# Patient Record
Sex: FEMALE | Race: WHITE | NOT HISPANIC OR LATINO | URBAN - METROPOLITAN AREA
[De-identification: names, ages, dates, MRNs, and addresses within clinical notes are randomized per-mention and may not be internally consistent; named-entity substitution may affect disease eponyms.]

---

## 2017-01-07 ENCOUNTER — COMMUNICATION - HEALTHEAST (OUTPATIENT)
Dept: INTERNAL MEDICINE | Facility: CLINIC | Age: 82
End: 2017-01-07

## 2017-01-07 DIAGNOSIS — M81.0 OSTEOPOROSIS: ICD-10-CM

## 2017-02-06 ENCOUNTER — COMMUNICATION - HEALTHEAST (OUTPATIENT)
Dept: INTERNAL MEDICINE | Facility: CLINIC | Age: 82
End: 2017-02-06

## 2017-02-06 DIAGNOSIS — J44.9 COPD (CHRONIC OBSTRUCTIVE PULMONARY DISEASE) (H): ICD-10-CM

## 2017-03-23 ENCOUNTER — COMMUNICATION - HEALTHEAST (OUTPATIENT)
Dept: INTERNAL MEDICINE | Facility: CLINIC | Age: 82
End: 2017-03-23

## 2017-03-23 ENCOUNTER — RECORDS - HEALTHEAST (OUTPATIENT)
Dept: ADMINISTRATIVE | Facility: OTHER | Age: 82
End: 2017-03-23

## 2017-03-23 DIAGNOSIS — R42 DIZZINESS: ICD-10-CM

## 2017-04-05 ENCOUNTER — COMMUNICATION - HEALTHEAST (OUTPATIENT)
Dept: INTERNAL MEDICINE | Facility: CLINIC | Age: 82
End: 2017-04-05

## 2017-04-07 ENCOUNTER — COMMUNICATION - HEALTHEAST (OUTPATIENT)
Dept: INTERNAL MEDICINE | Facility: CLINIC | Age: 82
End: 2017-04-07

## 2017-04-07 ENCOUNTER — RECORDS - HEALTHEAST (OUTPATIENT)
Dept: ADMINISTRATIVE | Facility: OTHER | Age: 82
End: 2017-04-07

## 2017-04-10 ENCOUNTER — COMMUNICATION - HEALTHEAST (OUTPATIENT)
Dept: INTERNAL MEDICINE | Facility: CLINIC | Age: 82
End: 2017-04-10

## 2017-04-10 DIAGNOSIS — M54.9 BACK PAIN: ICD-10-CM

## 2017-04-26 ENCOUNTER — COMMUNICATION - HEALTHEAST (OUTPATIENT)
Dept: INTERNAL MEDICINE | Facility: CLINIC | Age: 82
End: 2017-04-26

## 2017-04-26 ENCOUNTER — RECORDS - HEALTHEAST (OUTPATIENT)
Dept: ADMINISTRATIVE | Facility: OTHER | Age: 82
End: 2017-04-26

## 2017-06-20 ENCOUNTER — RECORDS - HEALTHEAST (OUTPATIENT)
Dept: ADMINISTRATIVE | Facility: OTHER | Age: 82
End: 2017-06-20

## 2017-09-21 ENCOUNTER — OFFICE VISIT - HEALTHEAST (OUTPATIENT)
Dept: INTERNAL MEDICINE | Facility: CLINIC | Age: 82
End: 2017-09-21

## 2017-09-21 DIAGNOSIS — Z00.00 HEALTH CARE MAINTENANCE: ICD-10-CM

## 2017-09-21 DIAGNOSIS — Z51.81 MEDICATION MONITORING ENCOUNTER: ICD-10-CM

## 2017-09-21 LAB
CHOLEST SERPL-MCNC: 189 MG/DL
FASTING STATUS PATIENT QL REPORTED: NO
HDLC SERPL-MCNC: 66 MG/DL
LDLC SERPL CALC-MCNC: 108 MG/DL
TRIGL SERPL-MCNC: 75 MG/DL

## 2017-09-22 ENCOUNTER — COMMUNICATION - HEALTHEAST (OUTPATIENT)
Dept: INTERNAL MEDICINE | Facility: CLINIC | Age: 82
End: 2017-09-22

## 2017-11-05 ENCOUNTER — COMMUNICATION - HEALTHEAST (OUTPATIENT)
Dept: INTERNAL MEDICINE | Facility: CLINIC | Age: 82
End: 2017-11-05

## 2017-11-05 DIAGNOSIS — E87.6 DIURETIC-INDUCED HYPOKALEMIA: ICD-10-CM

## 2017-11-05 DIAGNOSIS — R60.9 EDEMA: ICD-10-CM

## 2017-11-05 DIAGNOSIS — T50.2X5A DIURETIC-INDUCED HYPOKALEMIA: ICD-10-CM

## 2017-11-07 RX ORDER — FUROSEMIDE 40 MG
TABLET ORAL
Qty: 90 TABLET | Refills: 3 | Status: SHIPPED | OUTPATIENT
Start: 2017-11-07

## 2017-11-07 RX ORDER — POTASSIUM CHLORIDE 750 MG/1
CAPSULE, EXTENDED RELEASE ORAL
Qty: 90 CAPSULE | Refills: 3 | Status: SHIPPED | OUTPATIENT
Start: 2017-11-07

## 2017-11-24 ENCOUNTER — RECORDS - HEALTHEAST (OUTPATIENT)
Dept: ADMINISTRATIVE | Facility: OTHER | Age: 82
End: 2017-11-24

## 2017-12-12 ENCOUNTER — COMMUNICATION - HEALTHEAST (OUTPATIENT)
Dept: INTERNAL MEDICINE | Facility: CLINIC | Age: 82
End: 2017-12-12

## 2017-12-12 DIAGNOSIS — M54.9 BACK PAIN: ICD-10-CM

## 2017-12-16 ENCOUNTER — COMMUNICATION - HEALTHEAST (OUTPATIENT)
Dept: INTERNAL MEDICINE | Facility: CLINIC | Age: 82
End: 2017-12-16

## 2017-12-16 DIAGNOSIS — M81.0 OSTEOPOROSIS: ICD-10-CM

## 2017-12-17 RX ORDER — ALENDRONATE SODIUM 70 MG/1
TABLET ORAL
Qty: 12 TABLET | Refills: 1 | Status: SHIPPED | OUTPATIENT
Start: 2017-12-17

## 2017-12-28 ENCOUNTER — COMMUNICATION - HEALTHEAST (OUTPATIENT)
Dept: INTERNAL MEDICINE | Facility: CLINIC | Age: 82
End: 2017-12-28

## 2017-12-28 DIAGNOSIS — M54.9 BACK PAIN: ICD-10-CM

## 2017-12-29 RX ORDER — HYDROCODONE BITARTRATE AND ACETAMINOPHEN 5; 325 MG/1; MG/1
TABLET ORAL
Qty: 40 TABLET | Refills: 0 | Status: SHIPPED | OUTPATIENT
Start: 2017-12-29

## 2018-01-18 ENCOUNTER — RECORDS - HEALTHEAST (OUTPATIENT)
Dept: LAB | Facility: CLINIC | Age: 83
End: 2018-01-18

## 2018-01-18 LAB
ALBUMIN UR-MCNC: ABNORMAL MG/DL
ANION GAP SERPL CALCULATED.3IONS-SCNC: 7 MMOL/L (ref 5–18)
APPEARANCE UR: ABNORMAL
BACTERIA #/AREA URNS HPF: ABNORMAL HPF
BILIRUB UR QL STRIP: NEGATIVE
BUN SERPL-MCNC: 15 MG/DL (ref 8–28)
CALCIUM SERPL-MCNC: 9.4 MG/DL (ref 8.5–10.5)
CHLORIDE BLD-SCNC: 100 MMOL/L (ref 98–107)
CO2 SERPL-SCNC: 32 MMOL/L (ref 22–31)
COLOR UR AUTO: YELLOW
CREAT SERPL-MCNC: 0.69 MG/DL (ref 0.6–1.1)
GFR SERPL CREATININE-BSD FRML MDRD: >60 ML/MIN/1.73M2
GLUCOSE BLD-MCNC: 93 MG/DL (ref 70–125)
GLUCOSE UR STRIP-MCNC: NEGATIVE MG/DL
HGB BLD-MCNC: 12.2 G/DL (ref 12–16)
HGB UR QL STRIP: ABNORMAL
KETONES UR STRIP-MCNC: NEGATIVE MG/DL
LEUKOCYTE ESTERASE UR QL STRIP: ABNORMAL
MUCOUS THREADS #/AREA URNS LPF: ABNORMAL LPF
NITRATE UR QL: NEGATIVE
PH UR STRIP: 6.5 [PH] (ref 4.5–8)
POTASSIUM BLD-SCNC: 3.8 MMOL/L (ref 3.5–5)
RBC #/AREA URNS AUTO: ABNORMAL HPF
SODIUM SERPL-SCNC: 139 MMOL/L (ref 136–145)
SP GR UR STRIP: 1.01 (ref 1–1.03)
SQUAMOUS #/AREA URNS AUTO: ABNORMAL LPF
UROBILINOGEN UR STRIP-ACNC: ABNORMAL
WBC #/AREA URNS AUTO: >100 HPF
WBC CLUMPS #/AREA URNS HPF: PRESENT /[HPF]

## 2018-01-19 LAB — 25(OH)D3 SERPL-MCNC: 51.4 NG/ML (ref 30–80)

## 2018-01-20 LAB — BACTERIA SPEC CULT: ABNORMAL

## 2018-08-03 ENCOUNTER — RECORDS - HEALTHEAST (OUTPATIENT)
Dept: LAB | Facility: CLINIC | Age: 83
End: 2018-08-03

## 2018-08-06 LAB
ANION GAP SERPL CALCULATED.3IONS-SCNC: 9 MMOL/L (ref 5–18)
BUN SERPL-MCNC: 15 MG/DL (ref 8–28)
CALCIUM SERPL-MCNC: 9 MG/DL (ref 8.5–10.5)
CHLORIDE BLD-SCNC: 101 MMOL/L (ref 98–107)
CO2 SERPL-SCNC: 33 MMOL/L (ref 22–31)
CREAT SERPL-MCNC: 0.6 MG/DL (ref 0.6–1.1)
GFR SERPL CREATININE-BSD FRML MDRD: >60 ML/MIN/1.73M2
GLUCOSE BLD-MCNC: 87 MG/DL (ref 70–125)
POTASSIUM BLD-SCNC: 3.6 MMOL/L (ref 3.5–5)
SODIUM SERPL-SCNC: 143 MMOL/L (ref 136–145)

## 2021-05-31 VITALS — BODY MASS INDEX: 17.79 KG/M2 | WEIGHT: 106.9 LBS

## 2021-06-13 NOTE — PROGRESS NOTES
Assessment and Plan:   1.  Wellness assessment: Emerald lives with her daughter and grandson.  She would require assisted living without their assistance.  She denies depressive symptoms.  She has significant cognitive decline scoring 17 of 30 on Mini-Mental.  Her health risk assessment was discussed.  Resuscitation status was reaffirmed as DNR.      1. Health care maintenance  Flu shot was advised  - Influenza High Dose, Seasonal 65+ yrs    2.  COPD: O2 saturation is 95%.  She is using Advair.  She has not used albuterol recently    3.  Osteoporosis: She is using alendronate.  She continues a calcium and vitamin D supplement    4.  Sensorineural hearing loss: She uses hearing aids    Plan:  1.  Labs as outlined.  She will be notified of test result  2.  Mini-Mental was done and reviewed.  She scores 17 of 30  3.  Recheck bone density next year  4.  See in 1 year or as needed  The patient's current medical problems were reviewed.    The following health maintenance schedule was reviewed with the patient and provided in printed form in the after visit summary:   Health Maintenance   Topic Date Due     INFLUENZA VACCINE RULE BASED (1) 08/01/2017     FALL RISK ASSESSMENT  09/21/2018     DXA SCAN  12/09/2018     ADVANCE DIRECTIVES DISCUSSED WITH PATIENT  08/10/2020     TD 18+ HE  08/10/2025     PNEUMOCOCCAL POLYSACCHARIDE VACCINE AGE 65 AND OVER  Completed     PNEUMOCOCCAL CONJUGATE VACCINE FOR ADULTS (PCV13 OR PREVNAR)  Completed     ZOSTER VACCINE  Addressed        Subjective:   Chief Complaint: Emerald Schrader is an 89 y.o. female here for an Annual Wellness visit. She is accompanied by her daughter who provides history due to dementia     HPI:      Review of Systems:    She is feeling well. She mentions that her memory is poor.  Her daughter and  are providing support for her at home. Her grandson and his wife are currently living in the house and helping with care. She is also visited by a nurse every  60 days.  She is using Advair nightly. She has not needed albuterol. Her daughter contributes that they avoid hot places to help with breathing. She has not needed a nebulizer in over a year.   She saw the podiatrist earlier this week for a sore on the bottom of her right foot.   She denies stomach issues. Her daughter reports her appetite is good.   Please see above.  The rest of the review of systems are negative for all systems.    Patient Care Team:  Baltazar Palomino MD as PCP - General     Patient Active Problem List   Diagnosis     Emphysema     HTN (hypertension)     Mitral valve annular calcification     Allergy to cats     Dyspnea and respiratory abnormality     Heart murmur     Dementia     Actinic keratoses     COPD (chronic obstructive pulmonary disease)     Closed fracture of dorsal (thoracic) vertebra without mention of spinal cord injury     Other disorders of bone and cartilage(733.99)     Peripheral neuropathy     Shortness of breath     DNR (do not resuscitate)     Past Medical History:   Diagnosis Date     Hypertension       Past Surgical History:   Procedure Laterality Date     APPENDECTOMY       cataracts Bilateral      SKIN BIOPSY Left     distal ankle     TONSILLECTOMY        Family History   Problem Relation Age of Onset     Emphysema Father       Social History     Social History     Marital status:      Spouse name: N/A     Number of children: 2     Years of education: N/A     Occupational History     Not on file.     Social History Main Topics     Smoking status: Former Smoker     Start date: 1/1/1950     Quit date: 1/1/1975     Smokeless tobacco: Not on file     Alcohol use 0.6 oz/week     1 Cans of beer per week      Comment: occassional      Drug use: No     Sexual activity: Not on file     Other Topics Concern     Not on file     Social History Narrative      Current Outpatient Prescriptions   Medication Sig Dispense Refill     albuterol (PROVENTIL HFA;VENTOLIN HFA) 90  mcg/actuation inhaler Inhale 2 puffs every 6 (six) hours as needed for wheezing. 1 each 0     alendronate (FOSAMAX) 70 MG tablet Take 1 tablet (70 mg total) by mouth every 7 days. Take in the morning on an empty stomach with a full glass of water 30 minutes before food 4 tablet 11     calcium carbonate (OS-MIQUEL) 600 mg (1,500 mg) tablet Take 600 mg by mouth 2 (two) times a day with meals.       cholecalciferol, vitamin D3, 1,000 unit tablet Take 1,000 Units by mouth daily.       fluticasone-salmeterol (ADVAIR DISKUS) 250-50 mcg/dose DISKUS INHALE ONE PUFF BY MOUTH EVERY TWELVE HOURS 60 each 10     gabapentin (NEURONTIN) 100 MG capsule Take 100 mg by mouth 3 (three) times a day. 270 capsule 3     HYDROcodone-acetaminophen 5-325 mg per tablet Take 1-2 tablets by mouth every 4-6 hours as needed for pain. 40 tablet 0     ipratropium-albuterol (DUO-NEB) 0.5-2.5 mg/mL nebulizer 3 mL 4 (four) times a day as needed.       potassium chloride (MICRO-K) 10 mEq CR capsule TAKE ONE CAPSULE BY MOUTH ONE TIME DAILY 90 capsule 3     No current facility-administered medications for this visit.       Objective:   Vital Signs:   Visit Vitals     /68     Pulse 68     Wt 106 lb 14.4 oz (48.5 kg)     SpO2 93%     BMI 17.79 kg/m2        VisionScreening:  No exam data present     PHYSICAL EXAM  Constitutional:   Reveals an alert, pleasant, talkative, elderly woman. Affect appropriate. Does not seem acutely ill. Vitals: per nursing notes.  HEENT:  Atraumatic. Ears:  Moderate cerumen. TMs appear normal.     Eyes:  EOMs full, PERRL.  Oropharynx:   Mouth and throat clear, no thrush or exudate.  Neck:  Supple, no carotid bruits or adenopathy.  Back:  No abnormal kyphosis.   Thorax:  No bony deformities.  Lungs: Diminished air movement but no audible wheezes or rales.   Cardiac:   Regular rate and rhythm, normal S1, S2, no murmur or gallop.  Breasts:   No masses, no axillary adenopathy.  Abdomen:  Soft, active bowel sounds without bruits,  mass, or tenderness.  Extremities: Scabbed sore on interior aspect of right shin. Callus underlying the first MTP joint plantar surface. No foot ulcer. Pulses in the feet adequate.    Skin:  Erythematous area suggestive of actinic keratosis on left zygoma. No lesions to suggest malignancy.  Neuro:  Alert and talkative. No dysarthria or aphasia. Seems forgetful. Moves all extremities. No sensory deficits noted.     ADDITIONAL HISTORY SUMMARIZED (2): Reviewed past physical 8/12/16.  DECISION TO OBTAIN EXTRA INFORMATION (1): None.   RADIOLOGY TESTS (1): None.  LABS (1): Labs ordered.  MEDICINE TESTS (1): None.  INDEPENDENT REVIEW (2 each): None.     The visit lasted a total of 20 minutes face to face with the patient. Over 50% of the time was spent counseling and educating the patient about her memory.    IRanjan, am scribing for and in the presence of, Dr. Palomino.    IDr. Palomino, personally performed the services described in this documentation, as scribed by Ranjan Last in my presence, and it is both accurate and complete.      Assessment Results 9/21/2017   Activities of Daily Living 2-4 - Moderate impairment   Instrumental Activities of Daily Living 5-6 - Severe functional impairment   Mini Cog Total Score 1   Some recent data might be hidden     A Mini-Cog score of 0-2 suggests the possibility of dementia, score of 3-5 suggests no dementia    Identified Health Risks:     She is at risk for lack of exercise and has been provided with information to increase physical activity for the benefit of her well-being.  The patient reports that she has difficulty with activities of daily living.  It was discussed with Emerald her daughter.  Assisted living would be advised if her daughter can not manage her adequately at home in the future  The patient reports that she has difficulty with instrumental activities of daily living.  She was provided with a list of local organizations that provide support  services.  Patient's advanced directive was discussed and I am comfortable with the patient's wishes.        The patient was provided with appropriate referrals to address her memory problem.       I, Ranjan Last, am scribing for and in the presence of, Dr. Baltazar Palomino.    I, Dr. CARLYLE Palomino, personally performed the services described in this documentation, as scribed by Ranjan Last in my presence, and it is both accurate and complete.